# Patient Record
(demographics unavailable — no encounter records)

---

## 2024-11-16 NOTE — PHYSICAL EXAM
[de-identified] : Constitutional: alert, well nourished, obese, no acute distress and well developed . [place] sign negative today Eyes: normal sclera/conjunctiva, extra ocular movement intact and no proptosis.   ENT: the oropharynx was normal.   Neck: no neck mass was observed, the neck was supple and well healed scar.   Pulmonary: no respiratory distress, no accessory muscle use, lungs were clear to auscultation bilaterally.   Cardiac: normal S1 and S2, heart rate was normal and with a regular rhythm.   Vascular: no peripheral edema and the pedal pulses are present.   Abdomen: normal bowel sounds, non-tender, not distended and abdomen soft.   Lymphatic: no anterior cervical lymphadenopathy.   Back: no spinal tenderness, spine straight.   Endocrine: no stigmata of Cushings Syndrome.   Musculoskeletal: normal gait and muscle strength and tone were normal.   Skin: no rash, no acanthosis nigricans.   Neurology: deep tendon reflexes were 2+ and symmetric and no tremors.   Psychiatric: oriented to person, place, and time.

## 2024-11-16 NOTE — ASSESSMENT
[FreeTextEntry1] : 1) Hypoparathyroidism  Recent calcium is now high normal limit, 9.3 mg/dl Advised to reduce the calcium pills to 10 pills TID Vitamin D is now 44.4 ng/mL, gradually trending down compared to previous results. Therefore  will start vitamin D 10 mcg daily  I have discussed with pharmacy in the past- insurance will not cover the calcium supplements. The maximum dose of calcium supplement available is 600/1500 calcium TID. Continue hydrochlorothiazide 50 mcg daily- Patient still has hypercalciuria on 24 hour urine calcium.  We will hold off on starting recombinant parathyroid as calcium is improving Advised to follow up with urology and PCP to rule out kidney stone as she is complaining of left abdominal pain  Patient is requesting bariatric surgery referral for obesity.   2) Primary hypothyroidism Continue Euthyrox 100 mcg daily. The TFTs are within normal limits Continue to take the levothyroxine with an appropriate technique in the morning in an empty stomach and wait for 30 minutes to 1 hour to eat the breakfast Advised patient to take calcium supplement 2-3 hours apart from the levothyroxine dose  RTC in March 2025

## 2024-11-16 NOTE — PHYSICAL EXAM
[de-identified] : Constitutional: alert, well nourished, obese, no acute distress and well developed . [place] sign negative today Eyes: normal sclera/conjunctiva, extra ocular movement intact and no proptosis.   ENT: the oropharynx was normal.   Neck: no neck mass was observed, the neck was supple and well healed scar.   Pulmonary: no respiratory distress, no accessory muscle use, lungs were clear to auscultation bilaterally.   Cardiac: normal S1 and S2, heart rate was normal and with a regular rhythm.   Vascular: no peripheral edema and the pedal pulses are present.   Abdomen: normal bowel sounds, non-tender, not distended and abdomen soft.   Lymphatic: no anterior cervical lymphadenopathy.   Back: no spinal tenderness, spine straight.   Endocrine: no stigmata of Cushings Syndrome.   Musculoskeletal: normal gait and muscle strength and tone were normal.   Skin: no rash, no acanthosis nigricans.   Neurology: deep tendon reflexes were 2+ and symmetric and no tremors.   Psychiatric: oriented to person, place, and time.

## 2024-11-16 NOTE — HISTORY OF PRESENT ILLNESS
[FreeTextEntry1] :  ID:  [id number]  53-year-old Female with PMH of T2DM, hyperlipidemia, post-surgical Hypothyroidism and hypoparathyroidism is scheduled for follow up  Thyroid and parathyroid history: She was diagnosed with thyroid nodule 8 years ago. She had total thyroidectomy in [place] which was complicated by infarction of all parathyroid glands. Then patient developed hypothyroidism and hypoparathyroidism. She was told that thyroid nodule was not the PTC. Currently patient is on levothyroxine 100 mcg daily in the morning. Takes in an empty stomach from the food and medications. Calcitriol was increased to 0.5 mcg BID due to consistent hypocalcemia on last visit In the past patient was taking calcium 1500 mcg + vitamin D3 25 mcg (1000 IU) ( total dose is Ca: 22,500) + (vitamin D 15,000 daily), as vitamin D level was high. On prior visits, patient was advised to stop the vitamin D3 combination and just continue calcium 600 mg 14 tabs TID (currently she takes 42 tabs 3 times a day) -reports this supplement is not covered by insurance. She is also taking HCTZ 50 mg daily - increased in the past as the urine calcium was high 281mg/24hr  Today patient reports that she is feeling better and has less symptoms of hypocalcemia. However feeling left abdominal pain. denies hematuria, dysuria, pain during micturition.  ROS: Denies neck masses, swollen lymph nodes, palpitations, reports tingling of her face and arm tremors has improved a lot, denies diaphoresis, weight change

## 2024-11-16 NOTE — HISTORY OF PRESENT ILLNESS
[FreeTextEntry1] :  ID:  [id number]  53-year-old Female with PMH of T2DM, hyperlipidemia, post-surgical Hypothyroidism and hypoparathyroidism is scheduled for follow up  Thyroid and parathyroid history: She was diagnosed with thyroid nodule 8 years ago. She had total thyroidectomy in [place] which was complicated by infarction of all parathyroid glands. Then patient developed hypothyroidism and hypoparathyroidism. She was told that thyroid nodule was not the PTC. Currently patient is on levothyroxine 100 mcg daily in the morning. Takes in an empty stomach from the food and medications. Calcitriol was increased to 0.5 mcg BID due to consistent hypocalcemia on last visit In the past patient was taking calcium 1500 mcg + vitamin D3 25 mcg (1000 IU) ( total dose is Ca: 22,500) + (vitamin D 15,000 daily), as vitamin D level was high. On prior visits, patient was advised to stop the vitamin D3 combination and just continue calcium 600 mg 14 tabs TID (currently she takes 42 tabs 3 times a day) -reports this supplement is not covered by insurance. She is also taking HCTZ 50 mg daily - increased in the past as the urine calcium was high 281mg/24hr  Today patient reports that she is feeling better and has less symptoms of hypocalcemia. However feeling left abdominal pain. denies hematuria, dysuria, pain during micturition.  ROS: Denies neck masses, swollen lymph nodes, palpitations, reports tingling of her face and arm tremors has improved a lot, denies diaphoresis, weight change

## 2025-03-25 NOTE — ASSESSMENT
[FreeTextEntry1] : Her recent calcium is now high normal limit, 9.2 mg/dl Advised to reduce the calcium pills to 10 pills BID ( total for 20 tablets) ( total elemental calcium is 12,000)  Vitamin D is now 30ng/mL, gradually trending down compared to previous results. Therefore, continue vitamin D 10 mcg daily Continue hydrochlorothiazide 50 mcg daily- Patient still has hypercalciuria on 24-hour urine calcium, in Jan 2024 Will check the 24 hour urine calcium  2) Primary hypothyroidism Continue levothyroxine 100 mcg daily. The TFTs are within normal limits Continue to take the levothyroxine with an appropriate technique in the morning in an empty stomach and wait for 30 minutes to 1 hour to eat the breakfast Advised patient to take calcium supplement 2-3 hours apart from the levothyroxine dose  RTC in July 2025.

## 2025-03-25 NOTE — PHYSICAL EXAM
[de-identified] : Constitutional: alert, well nourished, obese, no acute distress and well developed . [place] sign negative today Eyes: normal sclera/conjunctiva, extra ocular movement intact and no proptosis.   ENT: the oropharynx was normal.   Neck: no neck mass was observed, the neck was supple and well healed scar.   Pulmonary: no respiratory distress, no accessory muscle use, lungs were clear to auscultation bilaterally.   Cardiac: normal S1 and S2, heart rate was normal and with a regular rhythm.   Vascular: no peripheral edema and the pedal pulses are present.   Abdomen: normal bowel sounds, non-tender, not distended and abdomen soft.   Lymphatic: no anterior cervical lymphadenopathy.   Back: no spinal tenderness, spine straight.   Endocrine: no stigmata of Cushings Syndrome.   Musculoskeletal: normal gait and muscle strength and tone were normal.   Skin: no rash, no acanthosis nigricans.   Neurology: deep tendon reflexes were 2+ and symmetric and no tremors.   Psychiatric: oriented to person, place, and time.

## 2025-03-25 NOTE — HISTORY OF PRESENT ILLNESS
[FreeTextEntry1] :  ID: [id number]  53-year-old Female with PMH of T2DM, hyperlipidemia, post-surgical Hypothyroidism and hypoparathyroidism is scheduled for follow up  Thyroid and parathyroid history: She was diagnosed with a thyroid nodule 8 years ago. She had a total thyroidectomy in [place], which was complicated by infarction of all parathyroid glands. Then patient developed hypothyroidism and hypoparathyroidism. She was told that thyroid nodule was not the PTC. Currently patient is on levothyroxine 100 mcg daily in the morning. takes with appropriate technique, in an empty stomach, separate from other medications and wait for 30 minutes to eat breakfast Calcitriol was increased to 0.5 mcg twice daily (BID) due to persistent hypocalcemia. The patient is currently taking calcium 1500 mg ( 15 tabs twice a day) + vitamin D3 10 mcg 400IU)  She is also taking HCTZ 50 mg daily - increased in the past as the urine calcium was high at 281mg/24hr  Today, the patient reports that she is feeling much better. Denies hematuria, dysuria, or pain during micturition. ROS: Denies neck masses, swollen lymph nodes, palpitations, reports tingling of her face and arm tremors has improved a lot, denies diaphoresis, weight change

## 2025-03-25 NOTE — PHYSICAL EXAM
[de-identified] : Constitutional: alert, well nourished, obese, no acute distress and well developed . [place] sign negative today Eyes: normal sclera/conjunctiva, extra ocular movement intact and no proptosis.   ENT: the oropharynx was normal.   Neck: no neck mass was observed, the neck was supple and well healed scar.   Pulmonary: no respiratory distress, no accessory muscle use, lungs were clear to auscultation bilaterally.   Cardiac: normal S1 and S2, heart rate was normal and with a regular rhythm.   Vascular: no peripheral edema and the pedal pulses are present.   Abdomen: normal bowel sounds, non-tender, not distended and abdomen soft.   Lymphatic: no anterior cervical lymphadenopathy.   Back: no spinal tenderness, spine straight.   Endocrine: no stigmata of Cushings Syndrome.   Musculoskeletal: normal gait and muscle strength and tone were normal.   Skin: no rash, no acanthosis nigricans.   Neurology: deep tendon reflexes were 2+ and symmetric and no tremors.   Psychiatric: oriented to person, place, and time.

## 2025-03-25 NOTE — HISTORY OF PRESENT ILLNESS
[FreeTextEntry1] :  ID: [id number]  53-year-old Female with PMH of T2DM, hyperlipidemia, post-surgical Hypothyroidism and hypoparathyroidism is scheduled for follow up  Thyroid and parathyroid history: She was diagnosed with a thyroid nodule 8 years ago. She had a total thyroidectomy in [place], which was complicated by infarction of all parathyroid glands. Then patient developed hypothyroidism and hypoparathyroidism. She was told that thyroid nodule was not the PTC. Currently patient is on levothyroxine 100 mcg daily in the morning. takes with appropriate technique, in an empty stomach, separate from other medications and wait for 30 minutes to eat breakfast Calcitriol was increased to 0.5 mcg twice daily (BID) due to persistent hypocalcemia. The patient is currently taking calcium 1500 mg ( 15 tabs twice a day) + vitamin D3 10 mcg 400IU)  She is also taking HCTZ 50 mg daily - increased in the past as the urine calcium was high at 281mg/24hr  Today, the patient reports that she is feeling much better. Denies hematuria, dysuria, or pain during micturition. ROS: Denies neck masses, swollen lymph nodes, palpitations, reports tingling of her face and arm tremors has improved a lot, denies diaphoresis, weight change

## 2025-03-25 NOTE — REASON FOR VISIT
[Follow - Up] : a follow-up visit [Hypothyroidism] : hypothyroidism [Hypocalcemia] : hypocalcemia [Hypoparathyroidism] : hypoparathyroidism

## 2025-03-25 NOTE — REVIEW OF SYSTEMS
[Fatigue] : fatigue [Decreased Appetite] : appetite not decreased [Recent Weight Gain (___ Lbs)] : no recent weight gain [Recent Weight Loss (___ Lbs)] : no recent weight loss [Blurred Vision] : no blurred vision [Neck Pain] : no neck pain [Chest Pain] : no chest pain [Palpitations] : no palpitations [Lower Ext Edema] : no lower extremity edema [Shortness Of Breath] : no shortness of breath [SOB on Exertion] : no shortness of breath on exertion [Nausea] : no nausea [Constipation] : no constipation [Abdominal Pain] : no abdominal pain [Vomiting] : no vomiting [Diarrhea] : no diarrhea [Polyuria] : no polyuria [Joint Pain] : no joint pain [Muscle Weakness] : no muscle weakness [Muscle Cramps] : no muscle cramps [Headaches] : no headaches [Dizziness] : no dizziness [Tremors] : no tremors [Pain/Numbness of Digits] : no pain/numbness of digits [Polydipsia] : no polydipsia [Cold Intolerance] : no cold intolerance

## 2025-04-01 NOTE — HISTORY OF PRESENT ILLNESS
[de-identified] : PARVIZ ROJAS  is a 53 year female who present in the office with morbid obesity (BMI 34.33 kg/m2) for bariatric surgery consultation and Medical Weight Loss Management. Patient was referred Dr Butcher. The patient has tried multiple methods to lose weight in the past including diets, Calorie-counting, restricted intake, portion control and exercise without any long term success. The patient has been obese for many years. Patient reports that She unable to lose weight. Patient seek weight loss medications for improvement of their activity level, health and comorbid conditions. Diet recall was done. Nutritional counseling has been provided. The patient is encouraged to remain calorie conscious and continue a low fat, low carbohydrate, high protein diet. Also, emphasis has been placed on the importance of adequate hydration, multi-vitamin supplementation and exercise   PARVIZ ROJAS PMHX includes Obesity, HTN, Hypothyroidism. PSHx of total thyroidectomy due to thyroid nodule, denies any history or family history of thyroid cancer.      was used for this visit ID [id number]

## 2025-04-01 NOTE — ASSESSMENT
[FreeTextEntry1] : PARVIZ ROJAS  is a 53 year female with morbid obesity with BMI of 34.33 kg/m2 which places patient in the obese category. This has directly contributed to patient's current medical conditions as well as, a decreased quality of life.   Patient seek weight loss for improvement of their activity level, health and comorbid conditions. Due to patients current BMI and obesity-related comorbidities. At this point, She is not a candidate for weight loss surgery by NIH criteria    The patient had the opportunity to ask pertinent questions which were answered. All of patients questions and concerns were addressed to patients satisfaction, and patient verbalized an understanding of the information discussed   Nutrition was reviewed and reinforced, including the importance of making healthy food choices. The importance of maintaining regular exercise/physical activity also reinforced.   Ms. ROJAS will schedule a visit with our program Registered Dietitian and attend support groups.   Patient was advised to have one vegetarian day a week, use smaller portions.   Reinforced need for adequate hydration (at least 64 oz daily) and adequate protein intake (at least 60 - 70 g day), minimize carbs   Patient will benefit from GLP1 agonist.  Ms. ROJAS denies any family history of thyroid C-Cell tumors or MEN 2 syndrome.   We discussed multiple options for weight loss including dietary and lifestyle modification, medical therapy including GLP-1 agonist   She is not a candidate for bariatric surgery.    Patient will be started on Metformin  Patient will be started on Zepbound pending prior authorization   RTO in 4 to 6 weeks

## 2025-04-01 NOTE — HISTORY OF PRESENT ILLNESS
[de-identified] : PARVIZ ROJAS  is a 53 year female who present in the office with morbid obesity (BMI 34.33 kg/m2) for bariatric surgery consultation and Medical Weight Loss Management. Patient was referred Dr Butcher. The patient has tried multiple methods to lose weight in the past including diets, Calorie-counting, restricted intake, portion control and exercise without any long term success. The patient has been obese for many years. Patient reports that She unable to lose weight. Patient seek weight loss medications for improvement of their activity level, health and comorbid conditions. Diet recall was done. Nutritional counseling has been provided. The patient is encouraged to remain calorie conscious and continue a low fat, low carbohydrate, high protein diet. Also, emphasis has been placed on the importance of adequate hydration, multi-vitamin supplementation and exercise   PARVIZ ROJAS PMHX includes Obesity, HTN, Hypothyroidism. PSHx of total thyroidectomy due to thyroid nodule, denies any history or family history of thyroid cancer.      was used for this visit ID [id number]

## 2025-04-01 NOTE — CONSULT LETTER
[Dear  ___] : Dear  [unfilled], [Courtesy Letter:] : I had the pleasure of seeing your patient, [unfilled], in my office today. [Consult Closing:] : Thank you very much for allowing me to participate in the care of this patient.  If you have any questions, please do not hesitate to contact me. [Sincerely,] : Sincerely, [FreeTextEntry3] : Dr Escalona

## 2025-04-01 NOTE — PHYSICAL EXAM
[Normal] : affect appropriate [de-identified] : Normoactive bowel sounds, soft and nontender, no hepatosplenomegaly or masses noted

## 2025-04-01 NOTE — PHYSICAL EXAM
[Normal] : affect appropriate [de-identified] : Normoactive bowel sounds, soft and nontender, no hepatosplenomegaly or masses noted

## 2025-04-01 NOTE — ADDENDUM
[FreeTextEntry1] :  I spent 40 minutes reviewing the patient's chart, labs, imaging, interviewing and examining patient, and discussing plan of care with the patient, and other providers, excluding teaching and separately reported services and separately billable procedures.

## 2025-04-01 NOTE — HISTORY OF PRESENT ILLNESS
[de-identified] : PARVIZ ROJAS  is a 53 year female who present in the office with morbid obesity (BMI 34.33 kg/m2) for bariatric surgery consultation and Medical Weight Loss Management. Patient was referred Dr Butcher. The patient has tried multiple methods to lose weight in the past including diets, Calorie-counting, restricted intake, portion control and exercise without any long term success. The patient has been obese for many years. Patient reports that She unable to lose weight. Patient seek weight loss medications for improvement of their activity level, health and comorbid conditions. Diet recall was done. Nutritional counseling has been provided. The patient is encouraged to remain calorie conscious and continue a low fat, low carbohydrate, high protein diet. Also, emphasis has been placed on the importance of adequate hydration, multi-vitamin supplementation and exercise   PARVIZ ROJAS PMHX includes Obesity, HTN, Hypothyroidism. PSHx of total thyroidectomy due to thyroid nodule, denies any history or family history of thyroid cancer.      was used for this visit ID [id number]

## 2025-07-30 NOTE — PHYSICAL EXAM
[de-identified] : Constitutional: alert, well nourished, obese, no acute distress and well developed . [place] sign negative today Eyes: normal sclera/conjunctiva, extra ocular movement intact and no proptosis.   ENT: the oropharynx was normal.   Neck: no neck mass was observed, the neck was supple and well healed scar.   Pulmonary: no respiratory distress, no accessory muscle use, lungs were clear to auscultation bilaterally.   Cardiac: normal S1 and S2, heart rate was normal and with a regular rhythm.   Vascular: no peripheral edema and the pedal pulses are present.   Abdomen: normal bowel sounds, non-tender, not distended and abdomen soft.   Lymphatic: no anterior cervical lymphadenopathy.   Back: no spinal tenderness, spine straight.   Endocrine: no stigmata of Cushings Syndrome.   Musculoskeletal: normal gait and muscle strength and tone were normal.   Skin: no rash, no acanthosis nigricans.   Neurology: deep tendon reflexes were 2+ and symmetric and no tremors.   Psychiatric: oriented to person, place, and time.

## 2025-07-30 NOTE — PHYSICAL EXAM
[de-identified] : Constitutional: alert, well nourished, obese, no acute distress and well developed . [place] sign negative today Eyes: normal sclera/conjunctiva, extra ocular movement intact and no proptosis.   ENT: the oropharynx was normal.   Neck: no neck mass was observed, the neck was supple and well healed scar.   Pulmonary: no respiratory distress, no accessory muscle use, lungs were clear to auscultation bilaterally.   Cardiac: normal S1 and S2, heart rate was normal and with a regular rhythm.   Vascular: no peripheral edema and the pedal pulses are present.   Abdomen: normal bowel sounds, non-tender, not distended and abdomen soft.   Lymphatic: no anterior cervical lymphadenopathy.   Back: no spinal tenderness, spine straight.   Endocrine: no stigmata of Cushings Syndrome.   Musculoskeletal: normal gait and muscle strength and tone were normal.   Skin: no rash, no acanthosis nigricans.   Neurology: deep tendon reflexes were 2+ and symmetric and no tremors.   Psychiatric: oriented to person, place, and time.

## 2025-07-30 NOTE — HISTORY OF PRESENT ILLNESS
[FreeTextEntry1] : Used  [id number]  54-year-old Female with PMH of T2DM, hyperlipidemia, post-surgical Hypothyroidism and hypoparathyroidism is scheduled for follow up  Thyroid and parathyroid history: She was diagnosed with a thyroid nodule 8 years ago. She had a total thyroidectomy in [place], which was complicated by infarction of all parathyroid glands. Then patient developed hypothyroidism and hypoparathyroidism. She was told that thyroid nodule was not the PTC. Currently patient is on levothyroxine 100 mcg daily in the morning. takes with appropriate technique, in an empty stomach, separate from other medications and wait for 30 minutes to eat breakfast Calcitriol was increased to 0.5 mcg twice daily (BID) due to persistent hypocalcemia on prior visit. The patient is currently taking calcium 1500 mg ( 20 tabs a day), has stopped taking vitamin D3 10 mcg. She is also taking HCTZ 50 mg daily - increased in the past as the urine calcium was high.  Today, the patient reports that she is feeling much better. Denies hematuria, dysuria, or pain during micturition. ROS: Denies neck masses, swollen lymph nodes, palpitations, reports tingling of her face and arm tremors has improved a lot, denies diaphoresis, weight change  Patient has seen the bariatric specialist for weight, currently she is not a candidate for bariatric surgery.  Zepbound or any other GLP-1 is not covered by her insurance.  Patient has not started metformin due to concerns of GI adverse effect.  Per patient, her PCP did the A1c and she is in the prediabetes range.

## 2025-07-30 NOTE — HISTORY OF PRESENT ILLNESS
[FreeTextEntry1] : Used  [id number]  54-year-old Female with PMH of T2DM, hyperlipidemia, post-surgical Hypothyroidism and hypoparathyroidism is scheduled for follow up  Thyroid and parathyroid history: She was diagnosed with a thyroid nodule 8 years ago. She had a total thyroidectomy in [place], which was complicated by infarction of all parathyroid glands. Then patient developed hypothyroidism and hypoparathyroidism. She was told that thyroid nodule was not the PTC. Currently patient is on levothyroxine 100 mcg daily in the morning. takes with appropriate technique, in an empty stomach, separate from other medications and wait for 30 minutes to eat breakfast Calcitriol was increased to 0.5 mcg twice daily (BID) due to persistent hypocalcemia on prior visit. The patient is currently taking calcium 1500 mg ( 20 tabs a day), has stopped taking vitamin D3 10 mcg. She is also taking HCTZ 50 mg daily - increased in the past as the urine calcium was high.  Today, the patient reports that she is feeling much better. Denies hematuria, dysuria, or pain during micturition. ROS: Denies neck masses, swollen lymph nodes, palpitations, reports tingling of her face and arm tremors has improved a lot, denies diaphoresis, weight change  Patient has seen the bariatric specialist for weight, currently she is not a candidate for bariatric surgery.  Zepbound or any other GLP-1 is not covered by her insurance.  Patient has not started metformin due to concerns of GI adverse effect.  Per patient, her PCP did the A1c and she is in the prediabetes range.

## 2025-07-30 NOTE — ASSESSMENT
[FreeTextEntry1] : Her recent calcium is now high normal limit, 8.8 mg/dl Advised to reduce the calcium pills ( total for 15 tablets) ( total elemental calcium is 9000)  Vitamin D is now 29.4 ng/mL, continue to hold on vitamin D supplement as patient has very high 24-hour urine calcium Continue hydrochlorothiazide 50 mcg daily- Patient still has hypercalciuria on 24-hour urine calcium Counseled patient to take calcitriol 0.5 mcg twice daily every alternate days with calcitriol 0.5 mcg once a day.  This may reduce her hypercalciuria.  Discussed that if she feels symptoms of hypocalcemia, she should start taking the calcitriol 0.5 mcg twice a day every day  2) Primary hypothyroidism Continue levothyroxine 100 mcg daily. The TFTs are within normal limits Continue to take the levothyroxine with an appropriate technique in the morning in an empty stomach and wait for 30 minutes to 1 hour to eat the breakfast Advised patient to take calcium supplement 2-3 hours apart from the levothyroxine dose  3) obesity: Patient is now developing prediabetes as told by PCP. Counseled patient again to start metformin and gradually titrate to 2 g as it can be used as a weight loss medication off label.  That will also help with her prediabetes. Zepbound or any other GLP-1 may not be covered as patient does not have diagnosis of diabetes. She is not a candidate for bariatric surgery as per the bariatric surgeon. Advised to reduce the carbs in the diet and increase exercise.  RTC in January 2026